# Patient Record
Sex: MALE | Race: BLACK OR AFRICAN AMERICAN | NOT HISPANIC OR LATINO | Employment: STUDENT | URBAN - METROPOLITAN AREA
[De-identification: names, ages, dates, MRNs, and addresses within clinical notes are randomized per-mention and may not be internally consistent; named-entity substitution may affect disease eponyms.]

---

## 2018-10-16 ENCOUNTER — OFFICE VISIT (OUTPATIENT)
Dept: URGENT CARE | Facility: CLINIC | Age: 23
End: 2018-10-16
Payer: OTHER MISCELLANEOUS

## 2018-10-16 VITALS
OXYGEN SATURATION: 96 % | DIASTOLIC BLOOD PRESSURE: 84 MMHG | HEART RATE: 75 BPM | WEIGHT: 300 LBS | TEMPERATURE: 98.4 F | HEIGHT: 74 IN | SYSTOLIC BLOOD PRESSURE: 126 MMHG | BODY MASS INDEX: 38.5 KG/M2 | RESPIRATION RATE: 18 BRPM

## 2018-10-16 DIAGNOSIS — L30.9 ECZEMA, UNSPECIFIED TYPE: ICD-10-CM

## 2018-10-16 DIAGNOSIS — E66.9 OBESITY (BMI 35.0-39.9 WITHOUT COMORBIDITY): ICD-10-CM

## 2018-10-16 PROCEDURE — 99204 OFFICE O/P NEW MOD 45 MIN: CPT | Performed by: NURSE PRACTITIONER

## 2018-10-16 RX ORDER — CLOBETASOL PROPIONATE 0.5 MG/G
1 OINTMENT TOPICAL 2 TIMES DAILY
Qty: 1 TUBE | Refills: 0 | Status: SHIPPED | OUTPATIENT
Start: 2018-10-16

## 2018-10-16 RX ORDER — PREDNISONE 10 MG/1
40 TABLET ORAL DAILY
Qty: 20 TAB | Refills: 0 | Status: SHIPPED | OUTPATIENT
Start: 2018-10-16 | End: 2018-10-21

## 2018-10-16 ASSESSMENT — ENCOUNTER SYMPTOMS
RHINORRHEA: 0
DIZZINESS: 0
EYE PAIN: 0
VOMITING: 0
FEVER: 0
MYALGIAS: 0
NAUSEA: 0
SHORTNESS OF BREATH: 0
FATIGUE: 0
SORE THROAT: 0
COUGH: 0
CHILLS: 0

## 2018-10-16 NOTE — PROGRESS NOTES
Subjective:     Justine Price is a 23 y.o. male who presents for Eczema (need steriod for flar up x 2weeks )  Patient presents to clinic today for evaluation of his eczema which has flared up in the last 2 weeks. Patient has a history of eczema in which he has attempted to use triamcinolone ointment with no relief in symptoms. Patient having good response to prednisone in the past to flareups of his eczema.     Rash   This is a new problem. Episode onset: 2 weeks. The problem is unchanged. The affected locations include the left arm, right arm and torso. The rash is characterized by redness, itchiness and dryness. It is unknown if there was an exposure to a precipitant. Pertinent negatives include no congestion, cough, eye pain, fatigue, fever, rhinorrhea, shortness of breath, sore throat or vomiting. Past treatments include nothing. The treatment provided no relief.   PMH:  has a past medical history of Eczema.  MEDS:   Current Outpatient Prescriptions:   •  clobetasol (TEMOVATE) 0.05 % Ointment, Apply 1 Application to affected area(s) 2 times a day., Disp: 1 Tube, Rfl: 0  •  predniSONE (DELTASONE) 10 MG Tab, Take 4 Tabs by mouth every day for 5 days., Disp: 20 Tab, Rfl: 0  ALLERGIES: No Known Allergies  SURGHX: History reviewed. No pertinent surgical history.  SOCHX:  reports that he has never smoked. He has never used smokeless tobacco. He reports that he does not drink alcohol or use drugs.  FH: Family history was reviewed, no pertinent findings to report   History reviewed. No pertinent family history. Review of Systems   Constitutional: Negative for chills, fatigue and fever.   HENT: Negative for congestion, rhinorrhea and sore throat.    Eyes: Negative for pain.   Respiratory: Negative for cough and shortness of breath.    Cardiovascular: Negative for chest pain.   Gastrointestinal: Negative for nausea and vomiting.   Genitourinary: Negative for hematuria.   Musculoskeletal: Negative for myalgias.   Skin:  "Positive for itching and rash.   Neurological: Negative for dizziness.   No Known Allergies   Objective:   /84   Pulse 75   Temp 36.9 °C (98.4 °F)   Resp 18   Ht 1.88 m (6' 2\")   Wt (!) 136.1 kg (300 lb)   SpO2 96%   BMI 38.52 kg/m²   Physical Exam   Constitutional: He is oriented to person, place, and time. He appears well-developed and well-nourished. No distress.   HENT:   Head: Normocephalic and atraumatic.   Eyes: Pupils are equal, round, and reactive to light. Conjunctivae and EOM are normal.   Cardiovascular: Normal rate and regular rhythm.    No murmur heard.  Pulmonary/Chest: Effort normal and breath sounds normal. No respiratory distress.   Abdominal: Soft. He exhibits no distension. There is no tenderness.   Neurological: He is alert and oriented to person, place, and time. He has normal reflexes. No sensory deficit.   Skin: Skin is warm and dry. Rash noted. Rash is maculopapular.        Psychiatric: He has a normal mood and affect.         Assessment/Plan:   Assessment    1. Eczema, unspecified type  clobetasol (TEMOVATE) 0.05 % Ointment    predniSONE (DELTASONE) 10 MG Tab   2. Obesity (BMI 35.0-39.9 without comorbidity)       Advised to trial topical steroid initially. If no response may start oral steroids at this time.  Patient's body mass index is 38.52 kg/m². Exercise and nutrition counseling were performed at this visit.  Differential diagnosis, natural history, supportive care, and indications for immediate follow-up discussed.    "